# Patient Record
Sex: FEMALE | Race: ASIAN | NOT HISPANIC OR LATINO | URBAN - METROPOLITAN AREA
[De-identification: names, ages, dates, MRNs, and addresses within clinical notes are randomized per-mention and may not be internally consistent; named-entity substitution may affect disease eponyms.]

---

## 2017-05-15 ENCOUNTER — INPATIENT (INPATIENT)
Facility: HOSPITAL | Age: 82
LOS: 2 days | Discharge: HOME | DRG: 872 | End: 2017-05-18
Attending: HOSPITALIST | Admitting: HOSPITALIST
Payer: COMMERCIAL

## 2017-05-15 VITALS
HEART RATE: 72 BPM | RESPIRATION RATE: 16 BRPM | OXYGEN SATURATION: 100 % | DIASTOLIC BLOOD PRESSURE: 73 MMHG | TEMPERATURE: 100 F | SYSTOLIC BLOOD PRESSURE: 132 MMHG

## 2017-05-15 PROCEDURE — 93010 ELECTROCARDIOGRAM REPORT: CPT

## 2017-05-15 PROCEDURE — 99285 EMERGENCY DEPT VISIT HI MDM: CPT | Mod: 25

## 2017-05-15 NOTE — ED ADULT NURSE NOTE - OBJECTIVE STATEMENT
80 y/o female hx of HTN, DM, HLD presents to the ED c/o head/neck/back pain s/p fall at home. As per family, pt has had multiple falls over course of 2 weeks. +head injury, no LOC. Pt denies vomiting, dizziness, CP, SOB, weakness, urinary s/s. Family states pt has been more confused, unsteady gait. Pt A&Ox2, in no respiratory distress, c/o neck/head/back pain, nausea. No visible deformity, ecchymosis, injury. PERRL 3mm, full ROM, difficulty walking. Pt straight cathed for urine, tolerated procedure well, c-collar in place. Pt resting comfortably with safety maintained and family at bedside.
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
## 2017-05-16 DIAGNOSIS — N39.0 URINARY TRACT INFECTION, SITE NOT SPECIFIED: ICD-10-CM

## 2017-05-16 DIAGNOSIS — E11.8 TYPE 2 DIABETES MELLITUS WITH UNSPECIFIED COMPLICATIONS: ICD-10-CM

## 2017-05-16 DIAGNOSIS — E10.9 TYPE 1 DIABETES MELLITUS WITHOUT COMPLICATIONS: ICD-10-CM

## 2017-05-16 DIAGNOSIS — I10 ESSENTIAL (PRIMARY) HYPERTENSION: ICD-10-CM

## 2017-05-16 DIAGNOSIS — A41.9 SEPSIS, UNSPECIFIED ORGANISM: ICD-10-CM

## 2017-05-16 DIAGNOSIS — E78.5 HYPERLIPIDEMIA, UNSPECIFIED: ICD-10-CM

## 2017-05-16 DIAGNOSIS — Z29.9 ENCOUNTER FOR PROPHYLACTIC MEASURES, UNSPECIFIED: ICD-10-CM

## 2017-05-16 LAB
ALBUMIN SERPL ELPH-MCNC: 2.8 G/DL — LOW (ref 3.3–5)
ALBUMIN SERPL ELPH-MCNC: 3.3 G/DL — SIGNIFICANT CHANGE UP (ref 3.3–5)
ALP SERPL-CCNC: 126 U/L — HIGH (ref 40–120)
ALP SERPL-CCNC: 146 U/L — HIGH (ref 40–120)
ALT FLD-CCNC: 51 U/L RC — HIGH (ref 10–45)
ALT FLD-CCNC: 57 U/L RC — HIGH (ref 10–45)
ANION GAP SERPL CALC-SCNC: 16 MMOL/L — SIGNIFICANT CHANGE UP (ref 5–17)
ANION GAP SERPL CALC-SCNC: 17 MMOL/L — SIGNIFICANT CHANGE UP (ref 5–17)
ANION GAP SERPL CALC-SCNC: 20 MMOL/L — HIGH (ref 5–17)
APPEARANCE UR: ABNORMAL
AST SERPL-CCNC: 58 U/L — HIGH (ref 10–40)
AST SERPL-CCNC: 63 U/L — HIGH (ref 10–40)
BACTERIA # UR AUTO: ABNORMAL /HPF
BASE EXCESS BLDV CALC-SCNC: 4 MMOL/L — HIGH (ref -2–2)
BILIRUB SERPL-MCNC: 1.1 MG/DL — SIGNIFICANT CHANGE UP (ref 0.2–1.2)
BILIRUB SERPL-MCNC: 1.3 MG/DL — HIGH (ref 0.2–1.2)
BILIRUB UR-MCNC: NEGATIVE — SIGNIFICANT CHANGE UP
BUN SERPL-MCNC: 14 MG/DL — SIGNIFICANT CHANGE UP (ref 7–23)
BUN SERPL-MCNC: 16 MG/DL — SIGNIFICANT CHANGE UP (ref 7–23)
BUN SERPL-MCNC: 16 MG/DL — SIGNIFICANT CHANGE UP (ref 7–23)
CA-I SERPL-SCNC: 1.16 MMOL/L — SIGNIFICANT CHANGE UP (ref 1.12–1.3)
CALCIUM SERPL-MCNC: 8.1 MG/DL — LOW (ref 8.4–10.5)
CALCIUM SERPL-MCNC: 9 MG/DL — SIGNIFICANT CHANGE UP (ref 8.4–10.5)
CALCIUM SERPL-MCNC: 9.1 MG/DL — SIGNIFICANT CHANGE UP (ref 8.4–10.5)
CHLORIDE BLDV-SCNC: 96 MMOL/L — SIGNIFICANT CHANGE UP (ref 96–108)
CHLORIDE SERPL-SCNC: 94 MMOL/L — LOW (ref 96–108)
CHLORIDE SERPL-SCNC: 96 MMOL/L — SIGNIFICANT CHANGE UP (ref 96–108)
CHLORIDE SERPL-SCNC: 98 MMOL/L — SIGNIFICANT CHANGE UP (ref 96–108)
CK MB BLD-MCNC: 0.7 % — SIGNIFICANT CHANGE UP (ref 0–3.5)
CK MB CFR SERPL CALC: 1.7 NG/ML — SIGNIFICANT CHANGE UP (ref 0–3.8)
CK SERPL-CCNC: 237 U/L — HIGH (ref 25–170)
CO2 BLDV-SCNC: 29 MMOL/L — SIGNIFICANT CHANGE UP (ref 22–30)
CO2 SERPL-SCNC: 20 MMOL/L — LOW (ref 22–31)
CO2 SERPL-SCNC: 21 MMOL/L — LOW (ref 22–31)
CO2 SERPL-SCNC: 24 MMOL/L — SIGNIFICANT CHANGE UP (ref 22–31)
COLOR SPEC: YELLOW — SIGNIFICANT CHANGE UP
CREAT SERPL-MCNC: 0.78 MG/DL — SIGNIFICANT CHANGE UP (ref 0.5–1.3)
CREAT SERPL-MCNC: 0.79 MG/DL — SIGNIFICANT CHANGE UP (ref 0.5–1.3)
CREAT SERPL-MCNC: 0.81 MG/DL — SIGNIFICANT CHANGE UP (ref 0.5–1.3)
DIFF PNL FLD: ABNORMAL
EPI CELLS # UR: SIGNIFICANT CHANGE UP /HPF
GAS PNL BLDV: 132 MMOL/L — LOW (ref 136–145)
GAS PNL BLDV: SIGNIFICANT CHANGE UP
GLUCOSE BLDV-MCNC: 116 MG/DL — HIGH (ref 70–99)
GLUCOSE SERPL-MCNC: 109 MG/DL — HIGH (ref 70–99)
GLUCOSE SERPL-MCNC: 150 MG/DL — HIGH (ref 70–99)
GLUCOSE SERPL-MCNC: 350 MG/DL — HIGH (ref 70–99)
GLUCOSE UR QL: 150
HCO3 BLDV-SCNC: 28 MMOL/L — SIGNIFICANT CHANGE UP (ref 21–29)
HCT VFR BLD CALC: 31.9 % — LOW (ref 34.5–45)
HCT VFR BLD CALC: 35.2 % — SIGNIFICANT CHANGE UP (ref 34.5–45)
HCT VFR BLDA CALC: 37 % — LOW (ref 39–50)
HGB BLD CALC-MCNC: 12 G/DL — SIGNIFICANT CHANGE UP (ref 11.5–15.5)
HGB BLD-MCNC: 11 G/DL — LOW (ref 11.5–15.5)
HGB BLD-MCNC: 12 G/DL — SIGNIFICANT CHANGE UP (ref 11.5–15.5)
KETONES UR-MCNC: NEGATIVE — SIGNIFICANT CHANGE UP
LACTATE BLDV-MCNC: 2.6 MMOL/L — HIGH (ref 0.7–2)
LEUKOCYTE ESTERASE UR-ACNC: ABNORMAL
MCHC RBC-ENTMCNC: 32.6 PG — SIGNIFICANT CHANGE UP (ref 27–34)
MCHC RBC-ENTMCNC: 32.9 PG — SIGNIFICANT CHANGE UP (ref 27–34)
MCHC RBC-ENTMCNC: 34.2 GM/DL — SIGNIFICANT CHANGE UP (ref 32–36)
MCHC RBC-ENTMCNC: 34.4 GM/DL — SIGNIFICANT CHANGE UP (ref 32–36)
MCV RBC AUTO: 95.2 FL — SIGNIFICANT CHANGE UP (ref 80–100)
MCV RBC AUTO: 95.7 FL — SIGNIFICANT CHANGE UP (ref 80–100)
NITRITE UR-MCNC: NEGATIVE — SIGNIFICANT CHANGE UP
PCO2 BLDV: 38 MMHG — SIGNIFICANT CHANGE UP (ref 35–50)
PH BLDV: 7.47 — HIGH (ref 7.35–7.45)
PH UR: 6 — SIGNIFICANT CHANGE UP (ref 5–8)
PLATELET # BLD AUTO: 198 K/UL — SIGNIFICANT CHANGE UP (ref 150–400)
PLATELET # BLD AUTO: 228 K/UL — SIGNIFICANT CHANGE UP (ref 150–400)
PO2 BLDV: 46 MMHG — HIGH (ref 25–45)
POTASSIUM BLDV-SCNC: 2.8 MMOL/L — CRITICAL LOW (ref 3.5–5)
POTASSIUM SERPL-MCNC: 3.1 MMOL/L — LOW (ref 3.5–5.3)
POTASSIUM SERPL-MCNC: 3.4 MMOL/L — LOW (ref 3.5–5.3)
POTASSIUM SERPL-MCNC: 3.6 MMOL/L — SIGNIFICANT CHANGE UP (ref 3.5–5.3)
POTASSIUM SERPL-SCNC: 3.1 MMOL/L — LOW (ref 3.5–5.3)
POTASSIUM SERPL-SCNC: 3.4 MMOL/L — LOW (ref 3.5–5.3)
POTASSIUM SERPL-SCNC: 3.6 MMOL/L — SIGNIFICANT CHANGE UP (ref 3.5–5.3)
PROT SERPL-MCNC: 7.3 G/DL — SIGNIFICANT CHANGE UP (ref 6–8.3)
PROT SERPL-MCNC: 8 G/DL — SIGNIFICANT CHANGE UP (ref 6–8.3)
PROT UR-MCNC: 150 MG/DL
RAPID RVP RESULT: SIGNIFICANT CHANGE UP
RBC # BLD: 3.33 M/UL — LOW (ref 3.8–5.2)
RBC # BLD: 3.7 M/UL — LOW (ref 3.8–5.2)
RBC # FLD: 12.1 % — SIGNIFICANT CHANGE UP (ref 10.3–14.5)
RBC # FLD: 12.2 % — SIGNIFICANT CHANGE UP (ref 10.3–14.5)
RBC CASTS # UR COMP ASSIST: SIGNIFICANT CHANGE UP /HPF (ref 0–2)
SAO2 % BLDV: 82 % — SIGNIFICANT CHANGE UP (ref 67–88)
SODIUM SERPL-SCNC: 134 MMOL/L — LOW (ref 135–145)
SODIUM SERPL-SCNC: 135 MMOL/L — SIGNIFICANT CHANGE UP (ref 135–145)
SODIUM SERPL-SCNC: 137 MMOL/L — SIGNIFICANT CHANGE UP (ref 135–145)
SP GR SPEC: 1.02 — SIGNIFICANT CHANGE UP (ref 1.01–1.02)
TROPONIN T SERPL-MCNC: <0.01 NG/ML — SIGNIFICANT CHANGE UP (ref 0–0.06)
UROBILINOGEN FLD QL: 4
WBC # BLD: 18.5 K/UL — HIGH (ref 3.8–10.5)
WBC # BLD: 21.7 K/UL — HIGH (ref 3.8–10.5)
WBC # FLD AUTO: 18.5 K/UL — HIGH (ref 3.8–10.5)
WBC # FLD AUTO: 21.7 K/UL — HIGH (ref 3.8–10.5)
WBC UR QL: SIGNIFICANT CHANGE UP /HPF (ref 0–5)

## 2017-05-16 PROCEDURE — 71010: CPT | Mod: 26,77

## 2017-05-16 PROCEDURE — 71010: CPT | Mod: 26

## 2017-05-16 PROCEDURE — 70450 CT HEAD/BRAIN W/O DYE: CPT | Mod: 26

## 2017-05-16 PROCEDURE — 99223 1ST HOSP IP/OBS HIGH 75: CPT | Mod: GC

## 2017-05-16 PROCEDURE — 72125 CT NECK SPINE W/O DYE: CPT | Mod: 26

## 2017-05-16 RX ORDER — INSULIN LISPRO 100/ML
VIAL (ML) SUBCUTANEOUS
Qty: 0 | Refills: 0 | Status: DISCONTINUED | OUTPATIENT
Start: 2017-05-16 | End: 2017-05-18

## 2017-05-16 RX ORDER — DEXTROSE 50 % IN WATER 50 %
1 SYRINGE (ML) INTRAVENOUS ONCE
Qty: 0 | Refills: 0 | Status: DISCONTINUED | OUTPATIENT
Start: 2017-05-16 | End: 2017-05-18

## 2017-05-16 RX ORDER — ATORVASTATIN CALCIUM 80 MG/1
10 TABLET, FILM COATED ORAL AT BEDTIME
Qty: 0 | Refills: 0 | Status: DISCONTINUED | OUTPATIENT
Start: 2017-05-16 | End: 2017-05-18

## 2017-05-16 RX ORDER — METOPROLOL TARTRATE 50 MG
1 TABLET ORAL
Qty: 0 | Refills: 0 | COMMUNITY

## 2017-05-16 RX ORDER — BRIMONIDINE TARTRATE 2 MG/MG
1 SOLUTION/ DROPS OPHTHALMIC
Qty: 0 | Refills: 0 | COMMUNITY

## 2017-05-16 RX ORDER — SODIUM CHLORIDE 9 MG/ML
1000 INJECTION, SOLUTION INTRAVENOUS
Qty: 0 | Refills: 0 | Status: DISCONTINUED | OUTPATIENT
Start: 2017-05-16 | End: 2017-05-18

## 2017-05-16 RX ORDER — PREDNISOLONE SODIUM PHOSPHATE 1 %
1 DROPS OPHTHALMIC (EYE)
Qty: 0 | Refills: 0 | Status: DISCONTINUED | OUTPATIENT
Start: 2017-05-16 | End: 2017-05-18

## 2017-05-16 RX ORDER — CHOLECALCIFEROL (VITAMIN D3) 125 MCG
5 CAPSULE ORAL
Qty: 0 | Refills: 0 | COMMUNITY

## 2017-05-16 RX ORDER — PIPERACILLIN AND TAZOBACTAM 4; .5 G/20ML; G/20ML
3.38 INJECTION, POWDER, LYOPHILIZED, FOR SOLUTION INTRAVENOUS EVERY 8 HOURS
Qty: 0 | Refills: 0 | Status: DISCONTINUED | OUTPATIENT
Start: 2017-05-16 | End: 2017-05-18

## 2017-05-16 RX ORDER — BIMATOPROST 0.3 MG/ML
1 SOLUTION/ DROPS OPHTHALMIC
Qty: 0 | Refills: 0 | COMMUNITY

## 2017-05-16 RX ORDER — DORZOLAMIDE HYDROCHLORIDE 20 MG/ML
1 SOLUTION/ DROPS OPHTHALMIC
Qty: 0 | Refills: 0 | COMMUNITY

## 2017-05-16 RX ORDER — CEFTRIAXONE 500 MG/1
1 INJECTION, POWDER, FOR SOLUTION INTRAMUSCULAR; INTRAVENOUS EVERY 24 HOURS
Qty: 0 | Refills: 0 | Status: DISCONTINUED | OUTPATIENT
Start: 2017-05-16 | End: 2017-05-16

## 2017-05-16 RX ORDER — DEXTROSE 50 % IN WATER 50 %
25 SYRINGE (ML) INTRAVENOUS ONCE
Qty: 0 | Refills: 0 | Status: DISCONTINUED | OUTPATIENT
Start: 2017-05-16 | End: 2017-05-18

## 2017-05-16 RX ORDER — IPRATROPIUM/ALBUTEROL SULFATE 18-103MCG
3 AEROSOL WITH ADAPTER (GRAM) INHALATION ONCE
Qty: 0 | Refills: 0 | Status: COMPLETED | OUTPATIENT
Start: 2017-05-16 | End: 2017-05-16

## 2017-05-16 RX ORDER — ATORVASTATIN CALCIUM 80 MG/1
1 TABLET, FILM COATED ORAL
Qty: 0 | Refills: 0 | COMMUNITY

## 2017-05-16 RX ORDER — PIPERACILLIN AND TAZOBACTAM 4; .5 G/20ML; G/20ML
3.38 INJECTION, POWDER, LYOPHILIZED, FOR SOLUTION INTRAVENOUS ONCE
Qty: 0 | Refills: 0 | Status: COMPLETED | OUTPATIENT
Start: 2017-05-16 | End: 2017-05-16

## 2017-05-16 RX ORDER — HEPARIN SODIUM 5000 [USP'U]/ML
5000 INJECTION INTRAVENOUS; SUBCUTANEOUS EVERY 8 HOURS
Qty: 0 | Refills: 0 | Status: DISCONTINUED | OUTPATIENT
Start: 2017-05-16 | End: 2017-05-18

## 2017-05-16 RX ORDER — BRIMONIDINE TARTRATE 2 MG/MG
1 SOLUTION/ DROPS OPHTHALMIC
Qty: 0 | Refills: 0 | Status: DISCONTINUED | OUTPATIENT
Start: 2017-05-16 | End: 2017-05-18

## 2017-05-16 RX ORDER — ACETAMINOPHEN 500 MG
1000 TABLET ORAL ONCE
Qty: 0 | Refills: 0 | Status: COMPLETED | OUTPATIENT
Start: 2017-05-15 | End: 2017-05-16

## 2017-05-16 RX ORDER — ASPIRIN/CALCIUM CARB/MAGNESIUM 324 MG
81 TABLET ORAL DAILY
Qty: 0 | Refills: 0 | Status: DISCONTINUED | OUTPATIENT
Start: 2017-05-16 | End: 2017-05-18

## 2017-05-16 RX ORDER — INSULIN DETEMIR 100/ML (3)
78 INSULIN PEN (ML) SUBCUTANEOUS
Qty: 0 | Refills: 0 | COMMUNITY

## 2017-05-16 RX ORDER — AMLODIPINE BESYLATE 2.5 MG/1
10 TABLET ORAL DAILY
Qty: 0 | Refills: 0 | Status: DISCONTINUED | OUTPATIENT
Start: 2017-05-16 | End: 2017-05-18

## 2017-05-16 RX ORDER — INSULIN LISPRO 100/ML
VIAL (ML) SUBCUTANEOUS AT BEDTIME
Qty: 0 | Refills: 0 | Status: DISCONTINUED | OUTPATIENT
Start: 2017-05-16 | End: 2017-05-16

## 2017-05-16 RX ORDER — PREDNISOLONE SODIUM PHOSPHATE 1 %
1 DROPS OPHTHALMIC (EYE)
Qty: 0 | Refills: 0 | COMMUNITY

## 2017-05-16 RX ORDER — INSULIN LISPRO 100/ML
VIAL (ML) SUBCUTANEOUS
Qty: 0 | Refills: 0 | Status: DISCONTINUED | OUTPATIENT
Start: 2017-05-16 | End: 2017-05-16

## 2017-05-16 RX ORDER — GLUCAGON INJECTION, SOLUTION 0.5 MG/.1ML
1 INJECTION, SOLUTION SUBCUTANEOUS ONCE
Qty: 0 | Refills: 0 | Status: DISCONTINUED | OUTPATIENT
Start: 2017-05-16 | End: 2017-05-18

## 2017-05-16 RX ORDER — ASPIRIN/CALCIUM CARB/MAGNESIUM 324 MG
1 TABLET ORAL
Qty: 0 | Refills: 0 | COMMUNITY

## 2017-05-16 RX ORDER — INSULIN GLARGINE 100 [IU]/ML
65 INJECTION, SOLUTION SUBCUTANEOUS EVERY MORNING
Qty: 0 | Refills: 0 | Status: DISCONTINUED | OUTPATIENT
Start: 2017-05-16 | End: 2017-05-17

## 2017-05-16 RX ORDER — INSULIN LISPRO 100/ML
VIAL (ML) SUBCUTANEOUS AT BEDTIME
Qty: 0 | Refills: 0 | Status: DISCONTINUED | OUTPATIENT
Start: 2017-05-16 | End: 2017-05-18

## 2017-05-16 RX ORDER — DORZOLAMIDE HYDROCHLORIDE 20 MG/ML
1 SOLUTION/ DROPS OPHTHALMIC THREE TIMES A DAY
Qty: 0 | Refills: 0 | Status: DISCONTINUED | OUTPATIENT
Start: 2017-05-16 | End: 2017-05-18

## 2017-05-16 RX ORDER — INSULIN GLARGINE 100 [IU]/ML
50 INJECTION, SOLUTION SUBCUTANEOUS EVERY MORNING
Qty: 0 | Refills: 0 | Status: DISCONTINUED | OUTPATIENT
Start: 2017-05-16 | End: 2017-05-16

## 2017-05-16 RX ORDER — SODIUM CHLORIDE 9 MG/ML
1000 INJECTION INTRAMUSCULAR; INTRAVENOUS; SUBCUTANEOUS ONCE
Qty: 0 | Refills: 0 | Status: COMPLETED | OUTPATIENT
Start: 2017-05-16 | End: 2017-05-16

## 2017-05-16 RX ORDER — HYDRALAZINE HCL 50 MG
10 TABLET ORAL ONCE
Qty: 0 | Refills: 0 | Status: DISCONTINUED | OUTPATIENT
Start: 2017-05-16 | End: 2017-05-16

## 2017-05-16 RX ORDER — METFORMIN HYDROCHLORIDE 850 MG/1
1 TABLET ORAL
Qty: 0 | Refills: 0 | COMMUNITY

## 2017-05-16 RX ORDER — DEXTROSE 50 % IN WATER 50 %
12.5 SYRINGE (ML) INTRAVENOUS ONCE
Qty: 0 | Refills: 0 | Status: DISCONTINUED | OUTPATIENT
Start: 2017-05-16 | End: 2017-05-18

## 2017-05-16 RX ORDER — METOPROLOL TARTRATE 50 MG
50 TABLET ORAL
Qty: 0 | Refills: 0 | Status: DISCONTINUED | OUTPATIENT
Start: 2017-05-16 | End: 2017-05-18

## 2017-05-16 RX ORDER — LATANOPROST 0.05 MG/ML
1 SOLUTION/ DROPS OPHTHALMIC; TOPICAL AT BEDTIME
Qty: 0 | Refills: 0 | Status: DISCONTINUED | OUTPATIENT
Start: 2017-05-16 | End: 2017-05-18

## 2017-05-16 RX ORDER — POTASSIUM CHLORIDE 20 MEQ
10 PACKET (EA) ORAL
Qty: 0 | Refills: 0 | Status: COMPLETED | OUTPATIENT
Start: 2017-05-16 | End: 2017-05-16

## 2017-05-16 RX ORDER — ACETAMINOPHEN 500 MG
1000 TABLET ORAL ONCE
Qty: 0 | Refills: 0 | Status: COMPLETED | OUTPATIENT
Start: 2017-05-16 | End: 2017-05-16

## 2017-05-16 RX ORDER — SODIUM CHLORIDE 9 MG/ML
1000 INJECTION INTRAMUSCULAR; INTRAVENOUS; SUBCUTANEOUS
Qty: 0 | Refills: 0 | Status: DISCONTINUED | OUTPATIENT
Start: 2017-05-16 | End: 2017-05-16

## 2017-05-16 RX ADMIN — HEPARIN SODIUM 5000 UNIT(S): 5000 INJECTION INTRAVENOUS; SUBCUTANEOUS at 23:55

## 2017-05-16 RX ADMIN — Medication 3 MILLILITER(S): at 08:00

## 2017-05-16 RX ADMIN — BRIMONIDINE TARTRATE 1 DROP(S): 2 SOLUTION/ DROPS OPHTHALMIC at 23:54

## 2017-05-16 RX ADMIN — HEPARIN SODIUM 5000 UNIT(S): 5000 INJECTION INTRAVENOUS; SUBCUTANEOUS at 13:26

## 2017-05-16 RX ADMIN — DORZOLAMIDE HYDROCHLORIDE 1 DROP(S): 20 SOLUTION/ DROPS OPHTHALMIC at 18:06

## 2017-05-16 RX ADMIN — Medication 3 MILLILITER(S): at 07:32

## 2017-05-16 RX ADMIN — PIPERACILLIN AND TAZOBACTAM 200 GRAM(S): 4; .5 INJECTION, POWDER, LYOPHILIZED, FOR SOLUTION INTRAVENOUS at 08:51

## 2017-05-16 RX ADMIN — PIPERACILLIN AND TAZOBACTAM 25 GRAM(S): 4; .5 INJECTION, POWDER, LYOPHILIZED, FOR SOLUTION INTRAVENOUS at 13:26

## 2017-05-16 RX ADMIN — Medication 100 MILLIEQUIVALENT(S): at 03:30

## 2017-05-16 RX ADMIN — INSULIN GLARGINE 50 UNIT(S): 100 INJECTION, SOLUTION SUBCUTANEOUS at 12:07

## 2017-05-16 RX ADMIN — Medication 100 MILLIEQUIVALENT(S): at 06:04

## 2017-05-16 RX ADMIN — Medication 50 MILLIGRAM(S): at 20:36

## 2017-05-16 RX ADMIN — Medication 3: at 13:26

## 2017-05-16 RX ADMIN — AMLODIPINE BESYLATE 10 MILLIGRAM(S): 2.5 TABLET ORAL at 20:37

## 2017-05-16 RX ADMIN — SODIUM CHLORIDE 100 MILLILITER(S): 9 INJECTION INTRAMUSCULAR; INTRAVENOUS; SUBCUTANEOUS at 08:51

## 2017-05-16 RX ADMIN — ATORVASTATIN CALCIUM 10 MILLIGRAM(S): 80 TABLET, FILM COATED ORAL at 23:56

## 2017-05-16 RX ADMIN — Medication 100 MILLIEQUIVALENT(S): at 04:40

## 2017-05-16 RX ADMIN — Medication 3 MILLILITER(S): at 07:25

## 2017-05-16 RX ADMIN — Medication 400 MILLIGRAM(S): at 07:32

## 2017-05-16 RX ADMIN — Medication 400 MILLIGRAM(S): at 00:10

## 2017-05-16 RX ADMIN — SODIUM CHLORIDE 1000 MILLILITER(S): 9 INJECTION INTRAMUSCULAR; INTRAVENOUS; SUBCUTANEOUS at 00:10

## 2017-05-16 RX ADMIN — CEFTRIAXONE 100 GRAM(S): 500 INJECTION, POWDER, FOR SOLUTION INTRAMUSCULAR; INTRAVENOUS at 01:55

## 2017-05-16 RX ADMIN — Medication 81 MILLIGRAM(S): at 12:07

## 2017-05-16 RX ADMIN — PIPERACILLIN AND TAZOBACTAM 25 GRAM(S): 4; .5 INJECTION, POWDER, LYOPHILIZED, FOR SOLUTION INTRAVENOUS at 23:55

## 2017-05-16 RX ADMIN — Medication 8: at 20:45

## 2017-05-16 RX ADMIN — Medication 1 DROP(S): at 20:42

## 2017-05-16 NOTE — DISCHARGE NOTE ADULT - PATIENT PORTAL LINK FT
“You can access the Follow[facility] Patient Portal, offered by [facility], by registering with the following website: [email or web address]”

## 2017-05-16 NOTE — H&P ADULT. - HISTORY OF PRESENT ILLNESS
80 YO F PMHx HTN, HLD, DM presents has been have multiple falls for the past 2 weeks presents s/p 3rd fall. Patient is a poor historian but states that her falls have occurred after she has gotten up from a seated position and she subsequently feels dizzy and then she falls. States yesterday she got up to use the bathroom and she felt dizzy and subsequently fell backward, hitting her head. She was on the floor for approx 7 minutes trying to get up and then eventually was able to get up by herself. She called her daughter who was a nurse and brought her to the hospital. Otherwise denies any LOC during these episodes, denies any CP, SOB, heart palpitations. In the ED, patient was noted to have a rectal temp of 102.5 and was found to have a UTI. She received 1 dose of ceftriaxone, IL bolus NS, ofirmev. A few hours later she became tachypneic, tachycardic, and AMS. She was placed on bipap and received another dose of ofirmev. On monitor patient was noted to be tachycardic to 110s, HTN to SBP 190s, tachypneic to 30s, but satting 100% on FiO2 40%. Patient was noted to wheezing at the time. CXR at the time showed clear lungs. Repeat blood work showed lactate increased from 2.6 to 4.1. Patient was given another dose of ofirmev, started on NS @ 100cc/hr, solumedrol 125mg IV, and was broadened to zosyn. Patient's AMS resolved and patient was down graded to NS and then RA and patient's SBP was 140s while on NC. Patient states she has been having increased urinary frequency and dysuria. Otherwise denies N/V, Fevers, chills, diarrhea, SOB, Chest pain different than her normal intermittent chest pain, cough. Patient states she currently feels at baseline    In ED: T: 100.2 (max 102.5)  HR: 72  BP: 132/73  RR: 16  100% RA  Given: NS 1L bolus, KCl x3 runs, ofirmev x 2, solumedrol 125mg IV, ceftriaxone x1, started on zosyn, started on IVF

## 2017-05-16 NOTE — H&P ADULT. - PROBLEM SELECTOR PLAN 2
most likely 2/2 orthostasis in the setting of UTI  CTH and C spine are negative for hemorrhage, fracture, or malalignment  PT consult

## 2017-05-16 NOTE — DISCHARGE NOTE ADULT - SECONDARY DIAGNOSIS.
UTI (urinary tract infection) Fall, initial encounter Essential hypertension Diabetes type 1, controlled HLD (hyperlipidemia)

## 2017-05-16 NOTE — ED PROVIDER NOTE - PROGRESS NOTE DETAILS
Called to assess admitted  pt by nurse. Pt wheezing , tachypneic , with rigors repeat oral temp 100. Pt ordered stat duonebs, tylenol and started on bipap. bedside us displays posterior blines with trace effusion. no anterior blines  karen chávez pt tolerating bipap, repeat cxray no pulm edema. lungs : clears , nor ales .will add steroids  to cover for potential allergic reaction. Discussed with inpatient team Dr. Pan who is aware and recommends another ekg.  karen chávez bipap d/c by admitting team, lact 4/0, pt started on ivf by admitting team  karen chávez

## 2017-05-16 NOTE — H&P ADULT. - RS GEN PE MLT RESP DETAILS PC
airway patent/clear to auscultation bilaterally/breath sounds equal/good air movement/normal/respirations non-labored

## 2017-05-16 NOTE — H&P ADULT. - PROBLEM SELECTOR PLAN 1
Patient was found to have a positive UA   ceftriaxone was broadened to zosyn  awaiting urine cx with T of 102.5 and WBC 18.5   most likely 2/2 UTI   will continue with zosyn  f/u urine cx and blood cx  will check RVP

## 2017-05-16 NOTE — H&P ADULT. - PROBLEM SELECTOR PLAN 3
hold home metformin 100mg BID, levamir 78 QD, novolog SSI   POC + ISS hold home metformin 100mg BID, novolog SSI   will give lantus 50 QD (home dose of levamir 78U QD)  POC + ISS

## 2017-05-16 NOTE — ED ADULT NURSE REASSESSMENT NOTE - NS ED NURSE REASSESS COMMENT FT1
C-collar cleared by Dr. Kidd
Pt resting comfortably maintained on c collar, assisted to bed pan. Antibiotics initiated, safety maintained with comfort measures applied, awaiting CT results.
Pt straight cathed for urine, tolerated procedure well. 300cc of clear gris colored urine with foul smell. NATHANIEL Martin at bedside
admitted team removed bipap from patient. pt tolerating 97% on 5LNC.
Report received from Zulma ARMSTRONG. Upon reassessment, pt tachypneic to 30s, O2Sat 95% 99.9 Po temp, pt rigoring. Md Hernandez at bedside. RT at bedside placing pt on Bipap.

## 2017-05-16 NOTE — H&P ADULT. - PROBLEM SELECTOR PLAN 4
c/w home metoprolol 50 BID, clonidine 0.1mg BID will hold home metoprolol 50 BID, clonidine 0.1mg BID, amlodipine/valsartan/HCTz combo pill in the acute setting  monitor BP and restart home BP meds as needed

## 2017-05-16 NOTE — H&P ADULT. - ASSESSMENT
80 YO F PMHx HTN, HLD, DM, glaucoma presents has been have multiple falls for the past 2 weeks presents s/p 3rd fall found to have a UTI and WBC of 18.5.

## 2017-05-16 NOTE — DISCHARGE NOTE ADULT - HOSPITAL COURSE
80 YO F PMHx HTN, HLD, DM presents has been have multiple falls for the past 2 weeks presents s/p 3rd fall. 80 YO F PMHx HTN, HLD, DM presents has been have multiple falls for the past 2 weeks presents s/p 3rd fall with head trauma. CTH and c-spine were negative for hemorrhage, fracture or malalignment. Patient was found to have a UTI and was started on ceftriaxone. Patient received 1 dose of ceftriaxone and 1L NS and a few hours later developed tachypnea and tachycardia. Patient was placed on bipap at that time and given 1 dose of ofirmev. Patient was agitated at the time and had AMS. Patient was broadened to zosyn and started on IVF. Patient's mental status returned to normal. It was believe that this episode of tachypnea and tachycardia was secondary to fever. Patient was weaned off of bipap to NC and then to RA, which patient tolerated well. Patient was normo-tensive at this time and home BP meds were held and her BP was monitored. 80 YO F PMHx HTN, HLD, DM presents has been have multiple falls for the past 2 weeks presents s/p 3rd fall with head trauma. CTH and c-spine were negative for hemorrhage, fracture or malalignment. Patient was found to have a UTI and was started on ceftriaxone. Patient received 1 dose of ceftriaxone and 1L NS and a few hours later developed tachypnea and tachycardia. Patient was placed on bipap at that time and given 1 dose of ofirmev. Patient was agitated at the time and had AMS. Patient was broadened to zosyn and started on IVF. Patient's mental status returned to normal. It was believe that this episode of tachypnea and tachycardia was secondary to fever. Patient was weaned off of bipap to NC and then to RA, which patient tolerated well. Patient was normo-tensive at this time and home BP meds were held and her BP was monitored. Patient started to become HTN and patient's home medications were restarted. Patient's urine culture grew e.coli ___. Patient's antibiotics were switched to ____. 82 YO F PMHx HTN, HLD, DM presents has been have multiple falls for the past 2 weeks presents s/p 3rd fall with head trauma. CTH and c-spine were negative for hemorrhage, fracture or malalignment. Patient was found to have a UTI and was started on ceftriaxone. Patient received 1 dose of ceftriaxone and 1L NS and a few hours later developed tachypnea and tachycardia. Patient was placed on bipap at that time and given 1 dose of ofirmev. Patient was agitated at the time and had AMS. Patient was broadened to zosyn and started on IVF. Patient's mental status returned to normal. It was believe that this episode of tachypnea and tachycardia was secondary to fever. Patient was weaned off of bipap to NC and then to RA, which patient tolerated well. Patient was normo-tensive at this time and home BP meds were held and her BP was monitored. Patient started to become HTN and patient's home medications were restarted. Patient's urine culture grew e.coli that was resistant to floroquinolones and bactrim, sensitive to cephalosporins. Patient's antibiotics were switched to Keflex to complete a 5 day course. She was discharged in improved and stable condition with home PT and prescription for rolling walker. She and her daughter were instructed to follow up with her primary care doctor within one week of discharge.

## 2017-05-16 NOTE — DISCHARGE NOTE ADULT - MEDICATION SUMMARY - MEDICATIONS TO TAKE
I will START or STAY ON the medications listed below when I get home from the hospital:    aspirin 81 mg oral tablet  -- 1 tab(s) by mouth once a day  -- Indication: For Primary prevention    cloNIDine 0.2 mg oral tablet  -- 1 tab(s) by mouth 2 times a day  -- Indication: For Hypertension    metFORMIN 1000 mg oral tablet, extended release  -- 1 tab(s) by mouth 2 times a day  -- Indication: For Diabetes    Levemir 100 units/mL subcutaneous solution  -- 78 unit(s) subcutaneous once a day  -- Indication: For Diabetes    NovoLOG 100 units/mL subcutaneous solution  -- Home insulin sliding scale -  subcutaneous three times per day with meals  -- Indication: For Diabetes    atorvastatin 10 mg oral tablet  -- 1 tab(s) by mouth once a day  -- Indication: For HLD (hyperlipidemia)    amlodipine/valsartan/hydrochlorothiazide 10 mg-320 mg-25 mg oral tablet  -- 1 tab(s) by mouth once a day  -- Indication: For Hypertension    metoprolol tartrate 50 mg oral tablet  -- 1 tab(s) by mouth 2 times a day  -- Indication: For Hypertension    cephalexin 500 mg oral tablet  -- 1 tab(s) by mouth 2 times a day  -- Finish all this medication unless otherwise directed by prescriber.    -- Indication: For UTI (urinary tract infection)    Pred Forte 1% ophthalmic suspension  -- 1 drop(s) to each affected eye 4 times a day  -- Indication: For Eye drops    brimonidine 0.1% ophthalmic solution  -- 1 drop(s) to each affected eye every 8 hours  -- Indication: For Eye drops    dorzolamide 2% ophthalmic solution  -- 1 drop(s) to each affected eye 3 times a day  -- Indication: For Eye drops    Lumigan 0.01% ophthalmic solution  -- 1 drop(s) to each affected eye once a day (in the evening)  -- Indication: For Eye drops    D3 1000  -- 5 tab(s) by mouth once a day  -- Indication: For Vitamins

## 2017-05-16 NOTE — ED PROVIDER NOTE - OBJECTIVE STATEMENT
82 yo female with PMH of HTN, HL, DM who presents with multiple falls during the course of 2 weeks, today was 3rd fall around 9:30 pm with + HS, no LOC. PAtient has been acting a little more confused. No known f/s/c/n/v/diarrhea. Denies any URI, GI, , GYn or ENT sx or Gi bleeding. No sob or pleuritic pain. states she occasionally has cp, but nothing unusual, has been goingon for a while. On exam, febrile to 102. 5 rectally  NAD but looks a little confused, FS normal, EKG normal, EOMI, pERRLA, CN II-XII normal, 5/5 UE and le stergnth, no c,t or ls spine ttp on log roll, she has diffuse paravertebral ttp no flank ttp, no hematomas or ecchymosis, pelvis stable, soft nt nd abdomen, no r/g/r, no cvat, cta bl, s1, s2, rrr,

## 2017-05-16 NOTE — DISCHARGE NOTE ADULT - PLAN OF CARE
You came in with a profound infection causing your vital signs to be abnormal. It was determined that the source of your infection was your urine. You were started on IV antibiotics which was switched over to oral antibiotics. You came in with a urinary tract infection and were started on antibiotics. Please ___. You came in after a fall. CT head and spine showed no bleeding and no fracture. Physical therapy ____. You have history of high blood pressure. Please continue your home medications. Please follow up with your PCP in 1 week. Please continue with your home metformin, Levemir, and novolog sliding scale. Please follow up with your PCP in 1 week. Please continue your home atorvastatin 10mg once a day. medication compliance You came in with a profound infection causing your vital signs to be abnormal. It was determined that the source of your infection was your urine. You were started on IV antibiotics which was switched over to oral antibiotics. Please complete an additional two day of antibiotics (keflex). The prescription was sent to your pharmacy [facility] on [address] in [place]. You came in with a urinary tract infection and were started on antibiotics. Please take an additional two days of antibiotic (Keflex). You came in after a fall. CT head and spine showed no bleeding and no fracture. Physical therapy recommended working with physical therapy in your home for strength training.

## 2017-05-16 NOTE — DISCHARGE NOTE ADULT - CARE PLAN
Principal Discharge DX:	Sepsis  Secondary Diagnosis:	UTI (urinary tract infection)  Secondary Diagnosis:	Fall, initial encounter  Secondary Diagnosis:	Essential hypertension  Secondary Diagnosis:	Diabetes type 1, controlled  Secondary Diagnosis:	HLD (hyperlipidemia) Principal Discharge DX:	Sepsis  Goal:	medication compliance  Instructions for follow-up, activity and diet:	You came in with a profound infection causing your vital signs to be abnormal. It was determined that the source of your infection was your urine. You were started on IV antibiotics which was switched over to oral antibiotics.  Secondary Diagnosis:	UTI (urinary tract infection)  Instructions for follow-up, activity and diet:	You came in with a urinary tract infection and were started on antibiotics. Please ___.  Secondary Diagnosis:	Fall, initial encounter  Instructions for follow-up, activity and diet:	You came in after a fall. CT head and spine showed no bleeding and no fracture. Physical therapy ____.  Secondary Diagnosis:	Essential hypertension  Instructions for follow-up, activity and diet:	You have history of high blood pressure. Please continue your home medications. Please follow up with your PCP in 1 week.  Secondary Diagnosis:	Diabetes type 1, controlled  Instructions for follow-up, activity and diet:	Please continue with your home metformin, Levemir, and novolog sliding scale. Please follow up with your PCP in 1 week.  Secondary Diagnosis:	HLD (hyperlipidemia)  Instructions for follow-up, activity and diet:	Please continue your home atorvastatin 10mg once a day. Principal Discharge DX:	Sepsis  Goal:	medication compliance  Instructions for follow-up, activity and diet:	You came in with a profound infection causing your vital signs to be abnormal. It was determined that the source of your infection was your urine. You were started on IV antibiotics which was switched over to oral antibiotics. Please complete an additional two day of antibiotics (keflex). The prescription was sent to your pharmacy [facility] on [address] in [place].  Secondary Diagnosis:	UTI (urinary tract infection)  Instructions for follow-up, activity and diet:	You came in with a urinary tract infection and were started on antibiotics. Please take an additional two days of antibiotic (Keflex).  Secondary Diagnosis:	Fall, initial encounter  Instructions for follow-up, activity and diet:	You came in after a fall. CT head and spine showed no bleeding and no fracture. Physical therapy recommended working with physical therapy in your home for strength training.  Secondary Diagnosis:	Essential hypertension  Instructions for follow-up, activity and diet:	You have history of high blood pressure. Please continue your home medications. Please follow up with your PCP in 1 week.  Secondary Diagnosis:	Diabetes type 1, controlled  Instructions for follow-up, activity and diet:	Please continue with your home metformin, Levemir, and novolog sliding scale. Please follow up with your PCP in 1 week.  Secondary Diagnosis:	HLD (hyperlipidemia)  Instructions for follow-up, activity and diet:	Please continue your home atorvastatin 10mg once a day. Principal Discharge DX:	Sepsis  Goal:	medication compliance  Instructions for follow-up, activity and diet:	You came in with a profound infection causing your vital signs to be abnormal. It was determined that the source of your infection was your urine. You were started on IV antibiotics which was switched over to oral antibiotics. Please complete an additional two day of antibiotics (keflex). The prescription was sent to your pharmacy [facility] on [address] in [place].  Secondary Diagnosis:	UTI (urinary tract infection)  Instructions for follow-up, activity and diet:	You came in with a urinary tract infection and were started on antibiotics. Please take an additional two days of antibiotic (Keflex).  Secondary Diagnosis:	Fall, initial encounter  Instructions for follow-up, activity and diet:	You came in after a fall. CT head and spine showed no bleeding and no fracture. Physical therapy recommended working with physical therapy in your home for strength training.  Secondary Diagnosis:	Essential hypertension  Instructions for follow-up, activity and diet:	You have history of high blood pressure. Please continue your home medications. Please follow up with your PCP in 1 week.  Secondary Diagnosis:	Diabetes type 1, controlled  Instructions for follow-up, activity and diet:	Please continue with your home metformin, Levemir, and novolog sliding scale. Please follow up with your PCP in 1 week.  Secondary Diagnosis:	HLD (hyperlipidemia)  Instructions for follow-up, activity and diet:	Please continue your home atorvastatin 10mg once a day. Principal Discharge DX:	Sepsis  Goal:	medication compliance  Instructions for follow-up, activity and diet:	You came in with a profound infection causing your vital signs to be abnormal. It was determined that the source of your infection was your urine. You were started on IV antibiotics which was switched over to oral antibiotics. Please complete an additional two day of antibiotics (keflex). The prescription was sent to your pharmacy [facility] on [address] in [place].  Secondary Diagnosis:	UTI (urinary tract infection)  Instructions for follow-up, activity and diet:	You came in with a urinary tract infection and were started on antibiotics. Please take an additional two days of antibiotic (Keflex).  Secondary Diagnosis:	Fall, initial encounter  Instructions for follow-up, activity and diet:	You came in after a fall. CT head and spine showed no bleeding and no fracture. Physical therapy recommended working with physical therapy in your home for strength training.  Secondary Diagnosis:	Essential hypertension  Instructions for follow-up, activity and diet:	You have history of high blood pressure. Please continue your home medications. Please follow up with your PCP in 1 week.  Secondary Diagnosis:	Diabetes type 1, controlled  Instructions for follow-up, activity and diet:	Please continue with your home metformin, Levemir, and novolog sliding scale. Please follow up with your PCP in 1 week.  Secondary Diagnosis:	HLD (hyperlipidemia)  Instructions for follow-up, activity and diet:	Please continue your home atorvastatin 10mg once a day.

## 2017-05-17 LAB
ANION GAP SERPL CALC-SCNC: 11 MMOL/L — SIGNIFICANT CHANGE UP (ref 5–17)
BUN SERPL-MCNC: 16 MG/DL — SIGNIFICANT CHANGE UP (ref 7–23)
CALCIUM SERPL-MCNC: 8.9 MG/DL — SIGNIFICANT CHANGE UP (ref 8.4–10.5)
CHLORIDE SERPL-SCNC: 103 MMOL/L — SIGNIFICANT CHANGE UP (ref 96–108)
CO2 SERPL-SCNC: 27 MMOL/L — SIGNIFICANT CHANGE UP (ref 22–31)
CREAT SERPL-MCNC: 0.69 MG/DL — SIGNIFICANT CHANGE UP (ref 0.5–1.3)
GLUCOSE SERPL-MCNC: 336 MG/DL — HIGH (ref 70–99)
HBA1C BLD-MCNC: 9.3 % — HIGH (ref 4–5.6)
HCT VFR BLD CALC: 33 % — LOW (ref 34.5–45)
HGB BLD-MCNC: 10.9 G/DL — LOW (ref 11.5–15.5)
MCHC RBC-ENTMCNC: 30.5 PG — SIGNIFICANT CHANGE UP (ref 27–34)
MCHC RBC-ENTMCNC: 33 GM/DL — SIGNIFICANT CHANGE UP (ref 32–36)
MCV RBC AUTO: 92.4 FL — SIGNIFICANT CHANGE UP (ref 80–100)
PLATELET # BLD AUTO: 231 K/UL — SIGNIFICANT CHANGE UP (ref 150–400)
POTASSIUM SERPL-MCNC: 3.7 MMOL/L — SIGNIFICANT CHANGE UP (ref 3.5–5.3)
POTASSIUM SERPL-SCNC: 3.7 MMOL/L — SIGNIFICANT CHANGE UP (ref 3.5–5.3)
RBC # BLD: 3.57 M/UL — LOW (ref 3.8–5.2)
RBC # FLD: 13.5 % — SIGNIFICANT CHANGE UP (ref 10.3–14.5)
SODIUM SERPL-SCNC: 141 MMOL/L — SIGNIFICANT CHANGE UP (ref 135–145)
WBC # BLD: 19.35 K/UL — HIGH (ref 3.8–10.5)
WBC # FLD AUTO: 19.35 K/UL — HIGH (ref 3.8–10.5)

## 2017-05-17 PROCEDURE — 99233 SBSQ HOSP IP/OBS HIGH 50: CPT | Mod: GC

## 2017-05-17 RX ORDER — INSULIN GLARGINE 100 [IU]/ML
78 INJECTION, SOLUTION SUBCUTANEOUS EVERY MORNING
Qty: 0 | Refills: 0 | Status: DISCONTINUED | OUTPATIENT
Start: 2017-05-17 | End: 2017-05-18

## 2017-05-17 RX ORDER — VALSARTAN 80 MG/1
320 TABLET ORAL DAILY
Qty: 0 | Refills: 0 | Status: DISCONTINUED | OUTPATIENT
Start: 2017-05-17 | End: 2017-05-18

## 2017-05-17 RX ORDER — POTASSIUM CHLORIDE 20 MEQ
40 PACKET (EA) ORAL ONCE
Qty: 0 | Refills: 0 | Status: DISCONTINUED | OUTPATIENT
Start: 2017-05-17 | End: 2017-05-17

## 2017-05-17 RX ADMIN — HEPARIN SODIUM 5000 UNIT(S): 5000 INJECTION INTRAVENOUS; SUBCUTANEOUS at 06:25

## 2017-05-17 RX ADMIN — INSULIN GLARGINE 65 UNIT(S): 100 INJECTION, SOLUTION SUBCUTANEOUS at 08:47

## 2017-05-17 RX ADMIN — HEPARIN SODIUM 5000 UNIT(S): 5000 INJECTION INTRAVENOUS; SUBCUTANEOUS at 13:21

## 2017-05-17 RX ADMIN — DORZOLAMIDE HYDROCHLORIDE 1 DROP(S): 20 SOLUTION/ DROPS OPHTHALMIC at 01:43

## 2017-05-17 RX ADMIN — Medication 50 MILLIGRAM(S): at 18:02

## 2017-05-17 RX ADMIN — AMLODIPINE BESYLATE 10 MILLIGRAM(S): 2.5 TABLET ORAL at 06:26

## 2017-05-17 RX ADMIN — Medication 0.2 MILLIGRAM(S): at 18:02

## 2017-05-17 RX ADMIN — BRIMONIDINE TARTRATE 1 DROP(S): 2 SOLUTION/ DROPS OPHTHALMIC at 06:24

## 2017-05-17 RX ADMIN — PIPERACILLIN AND TAZOBACTAM 25 GRAM(S): 4; .5 INJECTION, POWDER, LYOPHILIZED, FOR SOLUTION INTRAVENOUS at 06:26

## 2017-05-17 RX ADMIN — HEPARIN SODIUM 5000 UNIT(S): 5000 INJECTION INTRAVENOUS; SUBCUTANEOUS at 22:18

## 2017-05-17 RX ADMIN — Medication 8: at 18:01

## 2017-05-17 RX ADMIN — Medication 81 MILLIGRAM(S): at 13:21

## 2017-05-17 RX ADMIN — Medication 1 DROP(S): at 06:25

## 2017-05-17 RX ADMIN — DORZOLAMIDE HYDROCHLORIDE 1 DROP(S): 20 SOLUTION/ DROPS OPHTHALMIC at 13:21

## 2017-05-17 RX ADMIN — Medication 1 DROP(S): at 00:05

## 2017-05-17 RX ADMIN — Medication 1 DROP(S): at 18:02

## 2017-05-17 RX ADMIN — LATANOPROST 1 DROP(S): 0.05 SOLUTION/ DROPS OPHTHALMIC; TOPICAL at 01:43

## 2017-05-17 RX ADMIN — DORZOLAMIDE HYDROCHLORIDE 1 DROP(S): 20 SOLUTION/ DROPS OPHTHALMIC at 22:21

## 2017-05-17 RX ADMIN — LATANOPROST 1 DROP(S): 0.05 SOLUTION/ DROPS OPHTHALMIC; TOPICAL at 22:36

## 2017-05-17 RX ADMIN — VALSARTAN 320 MILLIGRAM(S): 80 TABLET ORAL at 08:44

## 2017-05-17 RX ADMIN — ATORVASTATIN CALCIUM 10 MILLIGRAM(S): 80 TABLET, FILM COATED ORAL at 22:17

## 2017-05-17 RX ADMIN — Medication 2: at 22:05

## 2017-05-17 RX ADMIN — Medication 50 MILLIGRAM(S): at 06:26

## 2017-05-17 RX ADMIN — PIPERACILLIN AND TAZOBACTAM 25 GRAM(S): 4; .5 INJECTION, POWDER, LYOPHILIZED, FOR SOLUTION INTRAVENOUS at 13:24

## 2017-05-17 RX ADMIN — PIPERACILLIN AND TAZOBACTAM 25 GRAM(S): 4; .5 INJECTION, POWDER, LYOPHILIZED, FOR SOLUTION INTRAVENOUS at 22:23

## 2017-05-17 RX ADMIN — DORZOLAMIDE HYDROCHLORIDE 1 DROP(S): 20 SOLUTION/ DROPS OPHTHALMIC at 06:24

## 2017-05-17 RX ADMIN — Medication 10: at 13:25

## 2017-05-17 RX ADMIN — Medication 8: at 08:44

## 2017-05-17 RX ADMIN — Medication 1 DROP(S): at 13:21

## 2017-05-17 RX ADMIN — BRIMONIDINE TARTRATE 1 DROP(S): 2 SOLUTION/ DROPS OPHTHALMIC at 18:02

## 2017-05-17 NOTE — PHYSICAL THERAPY INITIAL EVALUATION ADULT - PERTINENT HX OF CURRENT PROBLEM, REHAB EVAL
80 YO F PMHx HTN, HLD, DM, glaucoma presents has been have multiple falls for the past 2 weeks presents s/p 3rd fall found to have a UTI and WBC of 18.5. 82 YO F PMHx HTN, HLD, DM, glaucoma presents has been have multiple falls for the past 2 weeks presents s/p 3rd fall found to have a UTI and WBC of 18.5. EKG: sinus tachycardia. CT Cspine (5/16): diffuse osteopenia, (-) fx, enlarged thyroid gland

## 2017-05-17 NOTE — PHYSICAL THERAPY INITIAL EVALUATION ADULT - DISCHARGE DISPOSITION, PT EVAL
Home with home PT for gait, balance, & strength training, fall prevention, and to return pt to baseline functional mobility status. Rolling walker with ambulation (pt does not own). Supervision with mobility skills at this time. **Pt cleared for d/c home from PT perspective at this time./home w/ home PT

## 2017-05-17 NOTE — PHYSICAL THERAPY INITIAL EVALUATION ADULT - ADDITIONAL COMMENTS
Pt lives with daughter in 7th floor apartment (+) elevator, no stairs to enter. Pt does not own any ADs, states daughter works during the day.

## 2017-05-17 NOTE — PHYSICAL THERAPY INITIAL EVALUATION ADULT - PLANNED THERAPY INTERVENTIONS, PT EVAL
balance training/transfer training/strengthening/postural re-education/bed mobility training/gait training

## 2017-05-17 NOTE — PROVIDER CONTACT NOTE (OTHER) - ASSESSMENT
Patient denies dizziness, sweating or other signs of hyperglycemia. Vital signs stable. no acute changes on telemetry monitoring.

## 2017-05-18 VITALS
DIASTOLIC BLOOD PRESSURE: 74 MMHG | OXYGEN SATURATION: 97 % | HEART RATE: 76 BPM | SYSTOLIC BLOOD PRESSURE: 163 MMHG | RESPIRATION RATE: 19 BRPM | TEMPERATURE: 99 F

## 2017-05-18 LAB
-  AMIKACIN: SIGNIFICANT CHANGE UP
-  AMPICILLIN/SULBACTAM: SIGNIFICANT CHANGE UP
-  AMPICILLIN: SIGNIFICANT CHANGE UP
-  AZTREONAM: SIGNIFICANT CHANGE UP
-  CEFAZOLIN: SIGNIFICANT CHANGE UP
-  CEFEPIME: SIGNIFICANT CHANGE UP
-  CEFOXITIN: SIGNIFICANT CHANGE UP
-  CEFTAZIDIME: SIGNIFICANT CHANGE UP
-  CEFTRIAXONE: SIGNIFICANT CHANGE UP
-  CIPROFLOXACIN: SIGNIFICANT CHANGE UP
-  ERTAPENEM: SIGNIFICANT CHANGE UP
-  GENTAMICIN: SIGNIFICANT CHANGE UP
-  IMIPENEM: SIGNIFICANT CHANGE UP
-  LEVOFLOXACIN: SIGNIFICANT CHANGE UP
-  MEROPENEM: SIGNIFICANT CHANGE UP
-  NITROFURANTOIN: SIGNIFICANT CHANGE UP
-  PIPERACILLIN/TAZOBACTAM: SIGNIFICANT CHANGE UP
-  TOBRAMYCIN: SIGNIFICANT CHANGE UP
-  TRIMETHOPRIM/SULFAMETHOXAZOLE: SIGNIFICANT CHANGE UP
ANION GAP SERPL CALC-SCNC: 15 MMOL/L — SIGNIFICANT CHANGE UP (ref 5–17)
BASOPHILS # BLD AUTO: 0.01 K/UL — SIGNIFICANT CHANGE UP (ref 0–0.2)
BASOPHILS NFR BLD AUTO: 0.1 % — SIGNIFICANT CHANGE UP (ref 0–2)
BUN SERPL-MCNC: 14 MG/DL — SIGNIFICANT CHANGE UP (ref 7–23)
CALCIUM SERPL-MCNC: 8.6 MG/DL — SIGNIFICANT CHANGE UP (ref 8.4–10.5)
CHLORIDE SERPL-SCNC: 101 MMOL/L — SIGNIFICANT CHANGE UP (ref 96–108)
CK SERPL-CCNC: 117 U/L — SIGNIFICANT CHANGE UP (ref 25–170)
CO2 SERPL-SCNC: 26 MMOL/L — SIGNIFICANT CHANGE UP (ref 22–31)
CREAT SERPL-MCNC: 0.54 MG/DL — SIGNIFICANT CHANGE UP (ref 0.5–1.3)
CULTURE RESULTS: SIGNIFICANT CHANGE UP
EOSINOPHIL # BLD AUTO: 0.04 K/UL — SIGNIFICANT CHANGE UP (ref 0–0.5)
EOSINOPHIL NFR BLD AUTO: 0.3 % — SIGNIFICANT CHANGE UP (ref 0–6)
GLUCOSE SERPL-MCNC: 86 MG/DL — SIGNIFICANT CHANGE UP (ref 70–99)
HCT VFR BLD CALC: 34 % — LOW (ref 34.5–45)
HGB BLD-MCNC: 11.3 G/DL — LOW (ref 11.5–15.5)
IMM GRANULOCYTES NFR BLD AUTO: 0.2 % — SIGNIFICANT CHANGE UP (ref 0–1.5)
LYMPHOCYTES # BLD AUTO: 17.4 % — SIGNIFICANT CHANGE UP (ref 13–44)
LYMPHOCYTES # BLD AUTO: 2.56 K/UL — SIGNIFICANT CHANGE UP (ref 1–3.3)
MCHC RBC-ENTMCNC: 30.5 PG — SIGNIFICANT CHANGE UP (ref 27–34)
MCHC RBC-ENTMCNC: 33.2 GM/DL — SIGNIFICANT CHANGE UP (ref 32–36)
MCV RBC AUTO: 91.9 FL — SIGNIFICANT CHANGE UP (ref 80–100)
METHOD TYPE: SIGNIFICANT CHANGE UP
MONOCYTES # BLD AUTO: 1.01 K/UL — HIGH (ref 0–0.9)
MONOCYTES NFR BLD AUTO: 6.9 % — SIGNIFICANT CHANGE UP (ref 2–14)
NEUTROPHILS # BLD AUTO: 11.09 K/UL — HIGH (ref 1.8–7.4)
NEUTROPHILS NFR BLD AUTO: 75.1 % — SIGNIFICANT CHANGE UP (ref 43–77)
ORGANISM # SPEC MICROSCOPIC CNT: SIGNIFICANT CHANGE UP
ORGANISM # SPEC MICROSCOPIC CNT: SIGNIFICANT CHANGE UP
PLATELET # BLD AUTO: 269 K/UL — SIGNIFICANT CHANGE UP (ref 150–400)
POTASSIUM SERPL-MCNC: 3.1 MMOL/L — LOW (ref 3.5–5.3)
POTASSIUM SERPL-SCNC: 3.1 MMOL/L — LOW (ref 3.5–5.3)
RBC # BLD: 3.7 M/UL — LOW (ref 3.8–5.2)
RBC # FLD: 13.6 % — SIGNIFICANT CHANGE UP (ref 10.3–14.5)
SODIUM SERPL-SCNC: 142 MMOL/L — SIGNIFICANT CHANGE UP (ref 135–145)
SPECIMEN SOURCE: SIGNIFICANT CHANGE UP
WBC # BLD: 14.74 K/UL — HIGH (ref 3.8–10.5)
WBC # FLD AUTO: 14.74 K/UL — HIGH (ref 3.8–10.5)

## 2017-05-18 PROCEDURE — 87486 CHLMYD PNEUM DNA AMP PROBE: CPT

## 2017-05-18 PROCEDURE — 84295 ASSAY OF SERUM SODIUM: CPT

## 2017-05-18 PROCEDURE — 83605 ASSAY OF LACTIC ACID: CPT

## 2017-05-18 PROCEDURE — 87186 SC STD MICRODIL/AGAR DIL: CPT

## 2017-05-18 PROCEDURE — 96375 TX/PRO/DX INJ NEW DRUG ADDON: CPT

## 2017-05-18 PROCEDURE — 83036 HEMOGLOBIN GLYCOSYLATED A1C: CPT

## 2017-05-18 PROCEDURE — 82803 BLOOD GASES ANY COMBINATION: CPT

## 2017-05-18 PROCEDURE — 85014 HEMATOCRIT: CPT

## 2017-05-18 PROCEDURE — 97162 PT EVAL MOD COMPLEX 30 MIN: CPT

## 2017-05-18 PROCEDURE — 93005 ELECTROCARDIOGRAM TRACING: CPT

## 2017-05-18 PROCEDURE — 87581 M.PNEUMON DNA AMP PROBE: CPT

## 2017-05-18 PROCEDURE — 71045 X-RAY EXAM CHEST 1 VIEW: CPT

## 2017-05-18 PROCEDURE — 99285 EMERGENCY DEPT VISIT HI MDM: CPT | Mod: 25

## 2017-05-18 PROCEDURE — 94640 AIRWAY INHALATION TREATMENT: CPT

## 2017-05-18 PROCEDURE — 84484 ASSAY OF TROPONIN QUANT: CPT

## 2017-05-18 PROCEDURE — 70450 CT HEAD/BRAIN W/O DYE: CPT

## 2017-05-18 PROCEDURE — 82550 ASSAY OF CK (CPK): CPT

## 2017-05-18 PROCEDURE — 82553 CREATINE MB FRACTION: CPT

## 2017-05-18 PROCEDURE — 87086 URINE CULTURE/COLONY COUNT: CPT

## 2017-05-18 PROCEDURE — 82435 ASSAY OF BLOOD CHLORIDE: CPT

## 2017-05-18 PROCEDURE — 82330 ASSAY OF CALCIUM: CPT

## 2017-05-18 PROCEDURE — 87798 DETECT AGENT NOS DNA AMP: CPT

## 2017-05-18 PROCEDURE — 82947 ASSAY GLUCOSE BLOOD QUANT: CPT

## 2017-05-18 PROCEDURE — 72125 CT NECK SPINE W/O DYE: CPT

## 2017-05-18 PROCEDURE — 84132 ASSAY OF SERUM POTASSIUM: CPT

## 2017-05-18 PROCEDURE — 87040 BLOOD CULTURE FOR BACTERIA: CPT

## 2017-05-18 PROCEDURE — 85027 COMPLETE CBC AUTOMATED: CPT

## 2017-05-18 PROCEDURE — 87633 RESP VIRUS 12-25 TARGETS: CPT

## 2017-05-18 PROCEDURE — 80053 COMPREHEN METABOLIC PANEL: CPT

## 2017-05-18 PROCEDURE — 94660 CPAP INITIATION&MGMT: CPT

## 2017-05-18 PROCEDURE — 96374 THER/PROPH/DIAG INJ IV PUSH: CPT

## 2017-05-18 PROCEDURE — 81001 URINALYSIS AUTO W/SCOPE: CPT

## 2017-05-18 PROCEDURE — 99239 HOSP IP/OBS DSCHRG MGMT >30: CPT

## 2017-05-18 PROCEDURE — 80048 BASIC METABOLIC PNL TOTAL CA: CPT

## 2017-05-18 RX ORDER — INSULIN ASPART 100 [IU]/ML
0 INJECTION, SOLUTION SUBCUTANEOUS
Qty: 0 | Refills: 0 | COMMUNITY

## 2017-05-18 RX ORDER — INSULIN GLARGINE 100 [IU]/ML
65 INJECTION, SOLUTION SUBCUTANEOUS EVERY MORNING
Qty: 0 | Refills: 0 | Status: DISCONTINUED | OUTPATIENT
Start: 2017-05-18 | End: 2017-05-18

## 2017-05-18 RX ORDER — POTASSIUM CHLORIDE 20 MEQ
40 PACKET (EA) ORAL EVERY 4 HOURS
Qty: 0 | Refills: 0 | Status: COMPLETED | OUTPATIENT
Start: 2017-05-18 | End: 2017-05-18

## 2017-05-18 RX ADMIN — Medication 50 MILLIGRAM(S): at 05:08

## 2017-05-18 RX ADMIN — Medication 1 DROP(S): at 05:12

## 2017-05-18 RX ADMIN — DORZOLAMIDE HYDROCHLORIDE 1 DROP(S): 20 SOLUTION/ DROPS OPHTHALMIC at 05:22

## 2017-05-18 RX ADMIN — Medication 1 DROP(S): at 00:10

## 2017-05-18 RX ADMIN — AMLODIPINE BESYLATE 10 MILLIGRAM(S): 2.5 TABLET ORAL at 05:07

## 2017-05-18 RX ADMIN — Medication 81 MILLIGRAM(S): at 11:34

## 2017-05-18 RX ADMIN — Medication 0.2 MILLIGRAM(S): at 17:22

## 2017-05-18 RX ADMIN — HEPARIN SODIUM 5000 UNIT(S): 5000 INJECTION INTRAVENOUS; SUBCUTANEOUS at 05:11

## 2017-05-18 RX ADMIN — Medication 1 DROP(S): at 11:35

## 2017-05-18 RX ADMIN — Medication 2: at 11:53

## 2017-05-18 RX ADMIN — VALSARTAN 320 MILLIGRAM(S): 80 TABLET ORAL at 05:08

## 2017-05-18 RX ADMIN — Medication 40 MILLIEQUIVALENT(S): at 15:04

## 2017-05-18 RX ADMIN — INSULIN GLARGINE 65 UNIT(S): 100 INJECTION, SOLUTION SUBCUTANEOUS at 08:33

## 2017-05-18 RX ADMIN — PIPERACILLIN AND TAZOBACTAM 25 GRAM(S): 4; .5 INJECTION, POWDER, LYOPHILIZED, FOR SOLUTION INTRAVENOUS at 13:31

## 2017-05-18 RX ADMIN — Medication 50 MILLIGRAM(S): at 17:22

## 2017-05-18 RX ADMIN — BRIMONIDINE TARTRATE 1 DROP(S): 2 SOLUTION/ DROPS OPHTHALMIC at 05:46

## 2017-05-18 RX ADMIN — PIPERACILLIN AND TAZOBACTAM 25 GRAM(S): 4; .5 INJECTION, POWDER, LYOPHILIZED, FOR SOLUTION INTRAVENOUS at 05:05

## 2017-05-18 RX ADMIN — Medication 0.2 MILLIGRAM(S): at 05:08

## 2017-05-18 RX ADMIN — DORZOLAMIDE HYDROCHLORIDE 1 DROP(S): 20 SOLUTION/ DROPS OPHTHALMIC at 13:31

## 2017-05-18 RX ADMIN — HEPARIN SODIUM 5000 UNIT(S): 5000 INJECTION INTRAVENOUS; SUBCUTANEOUS at 13:30

## 2017-05-18 RX ADMIN — Medication 40 MILLIEQUIVALENT(S): at 10:09

## 2017-05-19 RX ORDER — CEPHALEXIN 500 MG
1 CAPSULE ORAL
Qty: 4 | Refills: 0 | OUTPATIENT
Start: 2017-05-19 | End: 2017-05-21

## 2017-05-21 LAB
CULTURE RESULTS: SIGNIFICANT CHANGE UP
CULTURE RESULTS: SIGNIFICANT CHANGE UP
SPECIMEN SOURCE: SIGNIFICANT CHANGE UP
SPECIMEN SOURCE: SIGNIFICANT CHANGE UP

## 2018-05-15 NOTE — ED ADULT TRIAGE NOTE - TEMPERATURE IN CELSIUS (DEGREES C)
Pt can not take both- appt needed to clarify. She will need labs done to check renal function, etc( fasting labs ordered)    Celebrex is not on out active medlist-- it was discontinued June of 2017. 37.9

## 2018-08-07 ENCOUNTER — OUTPATIENT (OUTPATIENT)
Dept: OUTPATIENT SERVICES | Facility: HOSPITAL | Age: 83
LOS: 1 days | End: 2018-08-07
Payer: COMMERCIAL

## 2018-08-07 DIAGNOSIS — Z01.818 ENCOUNTER FOR OTHER PREPROCEDURAL EXAMINATION: ICD-10-CM

## 2018-08-07 PROCEDURE — 93017 CV STRESS TEST TRACING ONLY: CPT

## 2018-08-07 PROCEDURE — A9502: CPT

## 2018-08-07 PROCEDURE — 78452 HT MUSCLE IMAGE SPECT MULT: CPT

## 2018-08-08 PROBLEM — E78.5 HYPERLIPIDEMIA, UNSPECIFIED: Chronic | Status: ACTIVE | Noted: 2017-05-16

## 2018-08-08 PROBLEM — I10 ESSENTIAL (PRIMARY) HYPERTENSION: Chronic | Status: ACTIVE | Noted: 2017-05-16

## 2018-08-08 PROBLEM — E10.9 TYPE 1 DIABETES MELLITUS WITHOUT COMPLICATIONS: Chronic | Status: ACTIVE | Noted: 2017-05-15

## 2025-01-27 NOTE — H&P ADULT. - PROBLEM/PLAN-2
New patient visit: Left Knee pain:     Patient ID: Derek West is a 54 y.o. male here for pain to the left knee. He is referred by MO Julio NP.     Chief Complaint: Lt knee pain, swelling     Location: LT knee     HPI:  Patient presents for pain involving the left knee. He states that he has had on and off problems with the knee in the past, states that he was seeing orthopedics ([place]) over a year ago. He states that he was treated with steroid injections. He states that the knee has been with increased pain since having a fall in October 2024. He states that he has treated with Tylenol and OTC bracing with mild relief. He states that he can not take Nsaids due to being on blood thinners (heart surgery) and Hx of pancreatitis.     He presents and rates his pain as 8/10. Pain is over the medial knee. He is having difficulty bearing weight at times. He reports knee instability intermittently with weight bearing. He is unable to fully extend and fully flex the knee due to the pain. Pain is aggravated by any weight bearing and ROM. He denies locking. He is noted with a mild limp.      Medical History:  Past Medical History:   Diagnosis Date    Closed displaced fracture of hook process of hamate bone     Closed fracture of fifth metacarpal bone     Closed fracture of fourth metacarpal bone of right hand     Diabetes mellitus     Hypertension     Tobacco abuse        Surgical History:  Past Surgical History:   Procedure Laterality Date    CRPP RIGHT RF/SF MC  Right 12/1/15    ORIF WRIST FRACTURE Right 12/1/2015    TONSILLECTOMY         Family History:  Family History   Problem Relation Name Age of Onset    No Known Problems Mother      No Known Problems Father         Allergies:   Review of patient's allergies indicates:  No Known Allergies        Review of Systems  Respiratory: Negative for shortness of breath.    Cardiovascular: Negative for chest pain.  Gastrointestinal: Negative for abdominal  pain.  Neurological: Negative for headaches.   Musculoskeletal ROS: positive for - LT knee joint pain, effusion  All other systems reviewed and are negative.        Objective:      Antalgic gait     Physical Exam:  Alignment: normal     ROM:   L -  Extension - 5 degrees,    Flexion- 110 degrees - mild pain with forced flexion  R - Extension- 0 degrees,     Flexion - 125 degrees  L- Strength:  Extension - 4/5      Flexion - 4/5  R - Strength:  Extension - 5/5      Flexion - 5/5  Effusion: trace + LT knee     Pat/Fem:   Crepitus - negative  Patellar grind test - negative   Pat tendon tenderness - negative  Tibial tubercle tenderness - negative  Apprehension - negative  Illiotibial band tenderness - negative   Anserine bursa tenderness - negative   Quad lift intact.     Ligaments: (LT Knee)  MCL: Medial opening @ 0:negative      @ 30: negative  Pain with palpation - guarded  LCL: Lateral opening @0: negative      @ 30: negative  Pain with palpation - negative     Ant Drawer - negative  PCL:  Sag - negative           Post Drawer - negative     Meniscus:  LT Knee  JL tenderness - medial knee  Antony - guarded- medial knee  Pain with forced flexion      Popliteal mass - negative     General appearance: NAD  Peripheral pulses: normal  Skin examination: no erythema   Reflexes: normal  Mood and Effect: normal  Palpation of lymph nodes: normal  Coordination: normal  Sensation: normal     Xray:  I ordered and reviewed a LT Knee series done today  No acute fracture or dislocation detected.  Minimal patellar osteophytosis.  No knee joint effusion.     Impression:     Minimal osteoarthritic change without evidence for acute radiographic abnormality.     Assessment:      LT knee pain, effusion and instability     Plan:      The total face-to-face encounter time with this patient was 25 mins and greater than 50% of of the encounter time was spent counseling the patient, coordinating care, and education regarding the pathology and  natural history of his diagnosis. I independently reviewed the radiographs done today.      Radiographs of the left knee was without abnormality.   Start directed physician guided exercises for ROM and strengthening- [facility] knee conditioning packet given with exercises reviewed. HEP 91197 - She was instructed and demonstrated exercises per [facility] knee rehab exercises for HEP which include Heel cord stretch, standing quad stretch, supine hamstring stretch, half squats, hamstring curls, calf raises, leg extensions, straight leg raises, hip abduction, hip adduction and  leg presses. The patient then demonstrated understanding of exercises and proper technique. This program was performed for 15 minutes.    Ice, ACE compression applied and WBAT as directed. Limit impact activities as advised.   Continue Tylenol as directed. No Nsaids.   LT Knee CSI done, see procedure note.   RTC 6 weeks, will get MRI if needed.   7.   He had no further questions.     PROCEDURE: LT Knee CSI  I have explained the risks, benefits, and alternatives of the procedure in detail. The patient voices understanding and all questions have been answered.  The patient agrees to proceed as planned. So after I performed a sterile prep of the skin in the normal fashion the left knee is injected using a 22 gauge needle from the lateral approach with a combination of 3cc 1% plain lidocaine and 40 mg of kenalog  The patient is cautioned and immediate relief of pain is secondary to the local anesthetic and will be temporary.  After the anesthetic wears off there may be a increase in pain that may last for a few hours or a few days and they should use ice to help alleviate this flair up of pain.     DISPLAY PLAN FREE TEXT